# Patient Record
Sex: MALE | Race: WHITE | URBAN - METROPOLITAN AREA
[De-identification: names, ages, dates, MRNs, and addresses within clinical notes are randomized per-mention and may not be internally consistent; named-entity substitution may affect disease eponyms.]

---

## 2017-03-26 ENCOUNTER — TRANSFERRED RECORDS (OUTPATIENT)
Dept: HEALTH INFORMATION MANAGEMENT | Facility: CLINIC | Age: 67
End: 2017-03-26

## 2017-04-12 ENCOUNTER — TRANSFERRED RECORDS (OUTPATIENT)
Dept: HEALTH INFORMATION MANAGEMENT | Facility: CLINIC | Age: 67
End: 2017-04-12

## 2017-04-26 ENCOUNTER — HOSPITAL ENCOUNTER (OUTPATIENT)
Dept: CARDIAC REHAB | Facility: CLINIC | Age: 67
End: 2017-04-26
Attending: INTERNAL MEDICINE
Payer: COMMERCIAL

## 2017-04-26 VITALS — WEIGHT: 148 LBS | HEIGHT: 67 IN | BODY MASS INDEX: 23.23 KG/M2

## 2017-04-26 PROCEDURE — 40000575 ZZH STATISTIC OP CARDIAC VISIT #2

## 2017-04-26 PROCEDURE — 40000116 ZZH STATISTIC OP CR VISIT

## 2017-04-26 PROCEDURE — 93797 PHYS/QHP OP CAR RHAB WO ECG: CPT

## 2017-04-26 PROCEDURE — 93798 PHYS/QHP OP CAR RHAB W/ECG: CPT

## 2017-04-26 ASSESSMENT — 6 MINUTE WALK TEST (6MWT)
TOTAL DISTANCE WALKED (FT): 1370
MALE CALC: 1769.83
FEMALE CALC: 1646.91
PREDICTED: 1780.62
GENDER SELECTION: MALE

## 2017-04-26 NOTE — PROGRESS NOTES
04/26/17 0800   Session  Les Morse  STEMI, PCI   Session Initial Evaluation and Exercise Prescription   Certified through this date 05/25/17   Cardiac Rehab Assessment    I have established, reviewed and made necessary changes to the individualized treatment plan and exercise prescription for this patient.    Physician Name (printed): ________________________   Date: _______  Time: ______    Physician Signature: ___________________________________________       Cardiac Rehab Assessment 4/26/17 Pt. presents to initial evaluation following at STEMI, PCI on 3/26/17. Pt. developed shingles on 3/22/17 and was told that if it began to spread that he should return to the hospital. Pt. woke up on 3/26/17 and noticed that the shingles had began to spread, he laid down on the couch and then began experiencing a sharp chest pain that radiated down his left arm. Pt. went to ER, where a 100% blockage to the LAD was found. The day after the stent placement, pt. noticed a cough and had trouble breathing. He began to develop acute hypoxic respiratory failure and required intubation on 3/30/17. Pt. had bleeding in his ETT. Bleeding did resolve in a few days and was extubated. Pt. did well after extubation and was able to discharge to home. Pt. is still dealing with the pain from shingles, but reports it is getting better. Pt. was very active before the STEMI and is looking forward to attending cardiac rehab to regain strength and endurance.     The patient's history and clinical status including hemodynamics and ECG were evaluated.  The patient was assessed to be stable and appropriate to begin exercise.   The patient's functional capacity and exercise prescription were determined by the completion of the 6 minute walk test.  See results below.  The patient was oriented to the program.  Risk factor profile was completed. Goals and objectives were discussed with patient participation and approval. CV response was WNL. No  symptoms, complaints or pain were reported. Good prognosis for reaching above goals. Skilled therapy is necessary in order to monitor CV response to exercise, to provide education on risk factors and behavior change counseling needed to achieve patient's goals.  Plan to progress to 30-40 minutes of exercise prior to discharge from cardiac rehab.  Initial THR of 20-30 beats above RHR; Effort rating of 4-6.  Initiate muscle conditioning as appropriate.  Provide risk factor education and behavior change counseling.      General Information   Treatment Diagnosis STEMI   Date of Treatment Diagnosis 03/26/17   Secondary Treatment Diagnosis Stent   Significant Past CV History None   Comorbidities Metastatic Cancer   Other Medical History Metastatic Colon Cancer to the Liver, recent shingles   Lead up symptoms Chest pain   Hospital Location [facility] Discharge Date 04/05/17   Signs and Symptoms Post Hospital Discharge Fatigue   Outpatient Cardiac Rehab Start Date 04/26/17   Primary Physician Dr. Bren Sheehan   Primary Physician Follow Up Scheduled   Cardiologist Dr. Greene   Cardiologist Follow Up Scheduled   Ejection Fraction 40%   Risk Stratification Moderate   Summary of Cath Report   Summary of Cath Report Available   Date Performed 03/26/17   LAD mid: 100%   Living and Work Status    Living Arrangements and Social Status house   Support System Live with an adult   Return to Employment Yes   Occupation , writes books, has podcasts   Preventative Medications   CMS recommended medications Ace inhibitors;Beta Blocker;Antiplatelets;Lipid Lowering   Falls Screen   Have you fallen two or more times in the past year? No   Have you fallen and had an injury in the past year? No   Referral Initiated to Physical Therapy No   Pain   Patient Currently in Pain Yes   Pain Location Left ear - from shingles   Pain Rating 3-4/10   Pain Description Sharp;Burning   Physical Assessments   Incisions WNL   Edema  "None   Right Lung Sounds normal   Left Lung Sounds normal   Limitations No limitations   Individualized Treatment Plan   Monitored Sessions Scheduled 18   Monitored Sessions Attended 1   Oxygen   Supplemental Oxygen needed No   Nutrition Management - Weight Management   Assessment Initial Assessment   Age 66   Weight 67.1 kg (148 lb)   Height 1.702 m (5' 7\")   BMI (Calculated) 23.23   Initial Rate Your Plate Score. Dietary tool to assess eating patterns. Scores range from 24 to 72. The higher the score the healthier the eating pattern. 57   Nutrition Management - Lipids   Lipids Labs Not Available   Prescribed Lipid Medication Yes   Statin Intensity High Intensity   Nutrition Management - Diabetes   Diabetes No   Nutrition Management Summary   Dietary Recommendations Low Sodium;Low Fat;Low Cholesterol   Stages of Change for Diet Compliance Maintenance   Interventions Planned Attend Nutrition Education Class(es)   Psychosocial Management   Psychosocial Assessment Initial   Is there history of clinical depression or increased risk of depression? No previous history   Current Level of Stress per Patient Report Moderate    Current Coping Skills Uses Stress Management/Relaxation Techniques;Has Positive Support System   Initial Patient Health Questionnaire -9 Score (PHQ-9) for depression. 5-9 Minimal symptoms, 10-14 Minor depression, 15-19 Major depression, moderately severe, > 20 Major depression, severe  2   Initial [facility] Survey score.  Quality of Life:   If total score > 25 review individual areas where patient rated a 4 or 5.  Consider patients current medical condition and what role that plays on the score.   Adjust treatment protocol to improve areas of concern.  Consider the following:  PHQ9 score, DASI, and re-assessment within the next 30 days to assist with developing treatments.  25   Stages of Change Preparation   Interventions Planned Patient to verbalize understanding of behavioral assessment " results;Patient to verbalize understanding of negative impact of stress to personal health;Patient will recognize signs and symptoms of depression;Patient to attend stress management class(es)   Patient Goal No   Psychosocial Comments 4/26/17 Pt. was previously , which was very stressful. Pt. has been cutting down on responsibilities since being in the hospital.   Other Core Components - Hypertension   History of or Diagnosis of Hypertension Yes   Currently taking Anti-Hypertensives Yes;Beta blocker;Ace Inhibitor   Other Core Components - Tobacco   History of Tobacco Use Yes   Quit Date or Planned Quit Date 01/01/80   Tobacco Use Status Former (Quit > 6 mo ago)   Tobacco Habit Cigarettes   Stages of Change Maintenance   Other Core Components Summary   Interventions Planned Instruct patient on the DASH diet   Patient Goals No   Activity/Exercise History   Activity/Exercise Assessment Initial   Activity/Exercise Status prior to event? Was Physically Active;Participated in an Exercise Program   Number of Days Currently participating in Moderate Physical Activity? 0   Number of Days Currently performing  Aerobic Exercise (including rehab)? 7   Number of Minutes per Session Currently of Aerobic Exercise (average)? 27   Current Stage of Change (Physical Activity) Preparation   Current Stage of Change (Aerobic Exercise) Action   Patient Goals Goal #1   Goal #1 Description Pt. will regain strength and endurance by attending [facility] 2 days per week and participating in aerobic exercise 2-3 days outside of rehab.   Goal #1 Target Date 06/26/17   Activity/Exercise Comments 4/26/17 Pt. states that he currently is walking about 1.5 miles everyday.   Activity/Exercise Target Outcome An Accumulation of 150  Minutes of Aerobic Activity per Week   Exercise Assessment   6 Minute Walk Predicted - Gender Selection Male   6 Minute Walk Predicted (Male) 1769.83   6 Minute Walk Predicted (Female) 1646.91   Initial 6 Minute  Walk Distance (Feet) 1370 ft   Resting HR 69 bpm   Exercise HR 81 bpm   Post Exercise HR 69 bpm   Resting /68   Exercise /68   Post Exercise /68   Effort Rating 4   Current MET Level 3   MET Level Goal 4.5-5   ECG Rhythm Normal sinus rhythm   Ectopy None   Current Symptoms Denies symptoms   Limitations/Restrictions None   Exercise Prescription   Mode Treadmill;Weights   Duration/Time 15-30 min;Intermittent bouts   Frequency 2 days/week   THR (85% of age predicted max HR) 132.6   OMNI Effort Rating (0-10 Scale) 4-6/10   Progression Continuous bouts;Intermittent bouts;Progress peak intensity by 1/4 MET per week;Progress peak intensity by 1/2 MET per week;Aerobic exercise to OMNI rating of 6 or below and at or below THR;Total exercise time of 20-30 minutes   Recommended Home Exercise   Type of Exercise Walking   Frequency (days per week) 2-3   Duration (minutes per session) 15-30 min;Intermittent   Effort Rating Recommended 4-6/10   30 Day Exercise Plan Pt. was encouraged    Learning Assessment   Learner Patient   Primary Language English   Preferred Learning Style Listening;Reading;Demonstration;Pictures/Video   Barriers to Learning No barriers noted   Patient Education   Education recommended Anatomy and Physiology of the Heart;Blood Pressure;Exercise Principles;Medication Overview;Nutrition;Stress Management   Education Comments 4/26/17 Pt. states him and his wife are very interested in coming to education classes.

## 2017-05-02 ENCOUNTER — HOSPITAL ENCOUNTER (OUTPATIENT)
Dept: CARDIAC REHAB | Facility: CLINIC | Age: 67
End: 2017-05-02
Attending: INTERNAL MEDICINE
Payer: COMMERCIAL

## 2017-05-02 PROCEDURE — 40000116 ZZH STATISTIC OP CR VISIT: Performed by: REHABILITATION PRACTITIONER

## 2017-05-02 PROCEDURE — 93798 PHYS/QHP OP CAR RHAB W/ECG: CPT | Performed by: REHABILITATION PRACTITIONER

## 2017-05-08 ENCOUNTER — HOSPITAL ENCOUNTER (OUTPATIENT)
Dept: CARDIAC REHAB | Facility: CLINIC | Age: 67
End: 2017-05-08
Attending: INTERNAL MEDICINE
Payer: COMMERCIAL

## 2017-05-08 PROCEDURE — 40000116 ZZH STATISTIC OP CR VISIT: Performed by: OCCUPATIONAL THERAPIST

## 2017-05-08 PROCEDURE — 93798 PHYS/QHP OP CAR RHAB W/ECG: CPT | Performed by: OCCUPATIONAL THERAPIST

## 2017-05-11 ENCOUNTER — HOSPITAL ENCOUNTER (OUTPATIENT)
Dept: CARDIAC REHAB | Facility: CLINIC | Age: 67
End: 2017-05-11
Attending: INTERNAL MEDICINE
Payer: COMMERCIAL

## 2017-05-11 VITALS — BODY MASS INDEX: 23.67 KG/M2 | WEIGHT: 150.8 LBS | HEIGHT: 67 IN

## 2017-05-11 PROCEDURE — 93798 PHYS/QHP OP CAR RHAB W/ECG: CPT | Performed by: OCCUPATIONAL THERAPIST

## 2017-05-11 PROCEDURE — 40000116 ZZH STATISTIC OP CR VISIT: Performed by: OCCUPATIONAL THERAPIST

## 2017-05-11 ASSESSMENT — 6 MINUTE WALK TEST (6MWT)
TOTAL DISTANCE WALKED (FT): 1370
FEMALE CALC: 1637.49
MALE CALC: 1762.99
GENDER SELECTION: MALE
PREDICTED: 1773.74

## 2017-05-11 NOTE — PROGRESS NOTES
Les Morse 66 year old   STEMI, Stent  05/11/17 1400   Session   Session 30 Day Individualized Treatment Plan   Certified through this date 06/10/17     Physician cosignature/electronic signature indicates approval of this ITP document. I have established, reviewed and made necessary changes to the individualized treatment plan and exercise prescription for this patient.   Cardiac Rehab Assessment   Cardiac Rehab Assessment 4/26/17 Pt. presents to initial evaluation following at STEMI, PCI on 3/26/17. Pt. developed shingles on 3/22/17 and was told that if it began to spread that he should return to the hospital. Pt. woke up on 3/26/17 and noticed that the shingles had began to spread, he laid down on the couch and then began experiencing a sharp chest pain that radiated down his left arm. Pt. went to ER, where a 100% blockage to the LAD was found. The day after the stent placement, pt. noticed a cough and had trouble breathing. He began to develop acute hypoxic repiratory failure and required intubation on 3/30/17. Pt. had bleeding in his ETT. Bleeding did resolve in a few days and was extubated. Pt. did well after extubation and was able to discharge to home. Pt. is still dealing with the pain from shingles, but reports it is getting better. Pt. was very active before the STEMI and is looking forward to attending cardiac rehab to regain strength and endurance. 5/11/2017 ITP forwarded for medical director's review. No change in current ITP plan. Plan to do progress update next week.   General Information   Treatment Diagnosis STEMI   Date of Treatment Diagnosis 03/26/17   Secondary Treatment Diagnosis Stent   Significant Past CV History None   Comorbidities Metastatic Cancer   Other Medical History Metastatic Colon Cancer to the Liver, recent shingles   Lead up symptoms Chest pain   Hospital Location [facility] Discharge Date 04/05/17   Signs and Symptoms Post Hospital Discharge Fatigue  "  Outpatient Cardiac Rehab Start Date 04/26/17   Primary Physician Dr. Bren Sheehan   Primary Physician Follow Up Scheduled   Cardiologist Dr. Greene   Cardiologist Follow Up Scheduled   Ejection Fraction 40%   Risk Stratification Moderate   Summary of Cath Report   Summary of Cath Report Available   Date Performed 03/26/17   LAD mid: 100%   Living and Work Status    Living Arrangements and Social Status house   Support System Live with an adult   Return to Employment Yes   Occupation , writes books, has podcasts   Preventative Medications   CMS recommended medications Ace inhibitors;Beta Blocker;Antiplatelets;Lipid Lowering   Falls Screen   Have you fallen two or more times in the past year? No   Have you fallen and had an injury in the past year? No   Referral Initiated to Physical Therapy No   Pain   Patient Currently in Pain Yes   Pain Location Left ear   Pain Rating 3-4/10   Pain Description Sharp;Burning   Physical Assessments   Incisions WNL   Edema None   Right Lung Sounds normal   Left Lung Sounds normal   Limitations No limitations   Individualized Treatment Plan   Monitored Sessions Scheduled 18   Monitored Sessions Attended 1   Oxygen   Supplemental Oxygen needed No   Nutrition Management - Weight Management   Assessment Re-assessment   Age 66   Weight 68.4 kg (150 lb 12.8 oz)   Height 1.702 m (5' 7.01\")   BMI (Calculated) 23.66   Initial Rate Your Plate Score. Dietary tool to assess eating patterns. Scores range from 24 to 72. The higher the score the healthier the eating pattern. 57   Nutrition Management - Lipids   Lipids Labs Not Available   Prescribed Lipid Medication Yes   Statin Intensity High Intensity   Nutrition Management - Diabetes   Diabetes No   Nutrition Management Summary   Dietary Recommendations Low Sodium;Low Fat;Low Cholesterol   Stages of Change for Diet Compliance Maintenance   Interventions Planned Attend Nutrition Education Class(es)   Psychosocial Management "   Psychosocial Assessment Initial   Is there history of clinical depression or increased risk of depression? No previous history   Current Level of Stress per Patient Report Moderate    Current Coping Skills Uses Stress Management/Relaxation Techniques;Has Positive Support System   Initial Patient Health Questionnaire -9 Score (PHQ-9) for depression. 5-9 Minimal symptoms, 10-14 Minor depression, 15-19 Major depression, moderately severe, > 20 Major depression, severe  2   Initial [facility] Survey score.  Quality of Life:   If total score > 25 review individual areas where patient rated a 4 or 5.  Consider patients current medical condition and what role that plays on the score.   Adjust treatment protocol to improve areas of concern.  Consider the following:  PHQ9 score, DASI, and re-assessment within the next 30 days to assist with developing treatments.  25   Stages of Change Preparation   Interventions Planned Patient to verbalize understanding of behavioral assessment results;Patient to verbalize understanding of negative impact of stress to personal health;Patient will recognize signs and symptoms of depression;Patient to attend stress management class(es)   Patient Goal No   Psychosocial Comments 4/26/17 Pt. was previously , which was very stressful. Pt. has been cutting down on responsibilities since being in the hospital.   Other Core Components - Hypertension   History of or Diagnosis of Hypertension Yes   Currently taking Anti-Hypertensives Yes;Beta blocker;Ace Inhibitor   Other Core Components - Tobacco   History of Tobacco Use Yes   Quit Date or Planned Quit Date 01/01/80   Tobacco Use Status Former (Quit > 6 mo ago)   Tobacco Habit Cigarettes   Stages of Change Maintenance   Other Core Components Summary   Interventions Planned Instruct patient on the DASH diet   Patient Goals No   Activity/Exercise History   Activity/Exercise Assessment Initial   Activity/Exercise Status prior to  event? Was Physically Active;Participated in an Exercise Program   Number of Days Currently participating in Moderate Physical Activity? 0   Number of Days Currently performing  Aerobic Exercise (including rehab)? 7   Number of Minutes per Session Currently of Aerobic Exercise (average)? 27   Current Stage of Change (Physical Activity) Preparation   Current Stage of Change (Aerobic Exercise) Action   Patient Goals Goal #1   Goal #1 Description Pt. will regain strength and endurance by attending [facility] 2 days per week and participating in aerobic exercise 2-3 days outside of rehab.   Goal #1 Target Date 06/26/17   Activity/Exercise Comments 4/26/17 Pt. states that he currently is walking about 1.5 miles everyday.   Activity/Exercise Target Outcome An Accumulation of 150  Minutes of Aerobic Activity per Week   Exercise Assessment   6 Minute Walk Predicted - Gender Selection Male   6 Minute Walk Predicted (Male) 1762.99   6 Minute Walk Predicted (Female) 1637.49   Initial 6 Minute Walk Distance (Feet) 1370 ft   Resting HR 67 bpm   Exercise HR 96 bpm   Post Exercise HR 70 bpm   Resting /72   Exercise /60   Post Exercise /72   Effort Rating 7   Current MET Level 3.4   MET Level Goal 4.5-5   ECG Rhythm Normal sinus rhythm   Ectopy None   Current Symptoms Denies symptoms   Limitations/Restrictions None   Exercise Prescription   Mode Treadmill;Weights   Duration/Time 15-30 min;Intermittent bouts   Frequency 2 days/week   THR (85% of age predicted max HR) 132.6   OMNI Effort Rating (0-10 Scale) 4-6/10   Progression Continuous bouts;Intermittent bouts;Progress peak intensity by 1/4 MET per week;Progress peak intensity by 1/2 MET per week;Aerobic exercise to OMNI rating of 6 or below and at or below THR;Total exercise time of 20-30 minutes   Recommended Home Exercise   Type of Exercise Walking   Frequency (days per week) 2-3   Duration (minutes per session) 15-30 min;Intermittent   Effort Rating Recommended  4-6/10   30 Day Exercise Plan Pt. was encouraged    Learning Assessment   Learner Patient   Primary Language English   Preferred Learning Style Listening;Reading;Demonstration;Pictures/Video   Barriers to Learning No barriers noted   Patient Education   Education recommended Anatomy and Physiology of the Heart;Blood Pressure;Exercise Principles;Medication Overview;Nutrition;Stress Management   Education Comments 4/26/17 Pt. state him and his wife are very interested in coming to education classes.

## 2017-05-15 ENCOUNTER — HOSPITAL ENCOUNTER (OUTPATIENT)
Dept: CARDIAC REHAB | Facility: CLINIC | Age: 67
End: 2017-05-15
Attending: INTERNAL MEDICINE
Payer: COMMERCIAL

## 2017-05-15 VITALS — BODY MASS INDEX: 23.92 KG/M2 | HEIGHT: 67 IN | WEIGHT: 152.4 LBS

## 2017-05-15 PROCEDURE — 40000116 ZZH STATISTIC OP CR VISIT: Performed by: REHABILITATION PRACTITIONER

## 2017-05-15 PROCEDURE — 93798 PHYS/QHP OP CAR RHAB W/ECG: CPT | Performed by: REHABILITATION PRACTITIONER

## 2017-05-15 ASSESSMENT — 6 MINUTE WALK TEST (6MWT)
PREDICTED: 1769.51
GENDER SELECTION: MALE
TOTAL DISTANCE WALKED (FT): 1370
FEMALE CALC: 1632.02
MALE CALC: 1758.79

## 2017-05-15 NOTE — PROGRESS NOTES
Les Morse 66 year old   STEMI,Stent 05/15/17 1400   Session   Session 60 Day Individualized Treatment Plan   Certified through this date 07/08/17   Physician cosignature/electronic signature indicates approval of this ITP document. I have established, reviewed and made necessary changes to the individualized treatment plan and exercise prescription for this patient.   Cardiac Rehab Assessment   Cardiac Rehab Assessment 4/26/17 Pt. presents to initial evaluation following at STEMI, PCI on 3/26/17. Pt. developed shingles on 3/22/17 and was told that if it began to spread that he should return to the hospital. Pt. woke up on 3/26/17 and noticed that the shingles had began to spread, he laid down on the couch and then began experiencing a sharp chest pain that radiated down his left arm. Pt. went to ER, where a 100% blockage to the LAD was found. The day after the stent placement, pt. noticed a cough and had trouble breathing. He began to develop acute hypoxic repiratory failure and required intubation on 3/30/17. Pt. had bleeding in his ETT. Bleeding did resolve in a few days and was extubated. Pt. did well after extubation and was able to discharge to home. Pt. is still dealing with the pain from shingles, but reports it is getting better. Pt. was very active before the STEMI and is looking forward to attending cardiac rehab to regain strength and endurance. 5/11/2017 ITP forwarded for medical director's review. No change in current ITP plan. Plan to do progress update next week. 5/15/2017 Progress update done today. PT is steadily progressing with the exercise levels. He is walking daily at home and has returned to work part-time-15 hours per week. PT has a strong Taoism eva and believes strongly that this help him through this last crisis. He doesn't feel he has many risk factors so he not sure why he had a MI. He will be having an echocardiogram later this week. Reviewed DASH diet with PT as he did not  "realize he had high blood pressure. He continues to benefit from education on recovery post MI as well as counseling for mangement of stress levels with skilled monitoring for sx as progress to higher levels.    General Information   Treatment Diagnosis STEMI   Date of Treatment Diagnosis 03/26/17   Secondary Treatment Diagnosis Stent   Significant Past CV History None   Comorbidities Metastatic Cancer   Other Medical History Metastatic Colon Cancer to the Liver, recent shingles   Lead up symptoms Chest pain   Hospital Location [facility]   Hospital Discharge Date 04/05/17   Signs and Symptoms Post Hospital Discharge Fatigue   Outpatient Cardiac Rehab Start Date 04/26/17   Primary Physician Dr. Bren Sheehan   Primary Physician Follow Up Scheduled   Cardiologist Dr. Greene   Cardiologist Follow Up Scheduled   Ejection Fraction 40%   Risk Stratification Moderate   Summary of Cath Report   Summary of Cath Report Available   Date Performed 03/26/17   LAD mid: 100%   Living and Work Status    Living Arrangements and Social Status house   Support System Live with an adult   Return to Employment Yes   Occupation , writes books, has podcasts   Preventative Medications   CMS recommended medications Ace inhibitors;Beta Blocker;Antiplatelets;Lipid Lowering   Falls Screen   Have you fallen two or more times in the past year? No   Have you fallen and had an injury in the past year? No   Referral Initiated to Physical Therapy No   Pain   Patient Currently in Pain No   Physical Assessments   Incisions WNL   Edema None   Right Lung Sounds normal   Left Lung Sounds normal   Limitations No limitations   Individualized Treatment Plan   Monitored Sessions Scheduled 18   Monitored Sessions Attended 5   Oxygen   Supplemental Oxygen needed No   Nutrition Management - Weight Management   Assessment Re-assessment   Age 66   Weight 69.1 kg (152 lb 6.4 oz)   Height 1.702 m (5' 7.01\")   BMI (Calculated) 23.91   Initial Rate " Your Plate Score. Dietary tool to assess eating patterns. Scores range from 24 to 72. The higher the score the healthier the eating pattern. 57   Weight Management Comments 5/15/2017 Weight is appropriate.   Nutrition Management - Lipids   Lipids Labs Not Available   Prescribed Lipid Medication Yes   Statin Intensity High Intensity   Lipid Comments 5/15/2017 No lab values available.   Nutrition Management - Diabetes   Diabetes No   Nutrition Management Summary   Dietary Recommendations Low Sodium;Low Fat;Low Cholesterol   Stages of Change for Diet Compliance Maintenance   Interventions Planned Attend Nutrition Education Class(es)   Interventions In Progress or Completed Other (see comments)   Nutrition Summary Comments 5/15/2017 PT feels he has a good understanding of his diet. He is not planning to attend the nutrition classes.   Psychosocial Management   Psychosocial Assessment Re-assessment   Is there history of clinical depression or increased risk of depression? No previous history   Current Level of Stress per Patient Report Moderate    Current Coping Skills Uses Stress Management/Relaxation Techniques;Has Positive Support System   Initial Patient Health Questionnaire -9 Score (PHQ-9) for depression. 5-9 Minimal symptoms, 10-14 Minor depression, 15-19 Major depression, moderately severe, > 20 Major depression, severe  2   Initial [facility] Survey score.  Quality of Life:   If total score > 25 review individual areas where patient rated a 4 or 5.  Consider patients current medical condition and what role that plays on the score.   Adjust treatment protocol to improve areas of concern.  Consider the following:  PHQ9 score, DASI, and re-assessment within the next 30 days to assist with developing treatments.  25   Stages of Change Preparation   Interventions Planned Patient to verbalize understanding of behavioral assessment results;Patient to verbalize understanding of negative impact of stress to personal  health;Patient will recognize signs and symptoms of depression;Patient to attend stress management class(es)   Patient Goal No   Psychosocial Comments 4/26/17 Pt. was previously , which was very stressful. Pt. has been cutting down on responsibilities since being in the hospital. 5/15/2017 PT has returned part-time to work. He denies any stress with work currently. His blood pressure had been higher in the past with his health issues as they occurred. He is thinking of attending the class on stress management. Given updated class schedule. He has a strong eva and believes in a higher power with illness.   Other Core Components - Hypertension   History of or Diagnosis of Hypertension Yes   Currently taking Anti-Hypertensives Yes;Beta blocker;Ace Inhibitor   Hypertension Comments 5/15/2017 PT did not realize he had high blood pressure and was on medication to keep it under control. His BP has been well-controlled while in rehab. Explained to PT the purpose on the Metoprolol and lisinopril in controlling blood pressure.    Other Core Components - Tobacco   History of Tobacco Use Yes   Quit Date or Planned Quit Date 01/01/80   Tobacco Use Status Former (Quit > 6 mo ago)   Tobacco Habit Cigarettes   Stages of Change Maintenance   Other Core Components Summary   Interventions Planned Instruct patient on the DASH diet   Interventions In Progress or Completed Instructed on DASH diet   Patient Goals No   Other Core Components Comments 5/15/2017 Given written information on the DASH diet and explained how it can help to control blood pressure.   Activity/Exercise History   Activity/Exercise Assessment Re-assessment   Activity/Exercise Status prior to event? Was Physically Active;Participated in an Exercise Program   Number of Days Currently participating in Moderate Physical Activity? 0   Number of Days Currently performing  Aerobic Exercise (including rehab)? 7   Number of Minutes per Session Currently of  Aerobic Exercise (average)? 30-40   Current Stage of Change (Physical Activity) Preparation   Current Stage of Change (Aerobic Exercise) Action   Patient Goals Goal #1   Goal #1 Description Pt. will regain strength and endurance by attending [facility] 2 days per week and participating in aerobic exercise 2-3 days outside of rehab.   Goal #1 Target Date 06/26/17   Goal #1 Progress Towards Goal 5/15/2017 PT is attending cardiac rehab and walking daily for 1.5-2 miles. He found walking at the faster speed of 2.8 mph to be very challenging last session, but would like to try again next time. He is currently at 2.7 mph with a 1% grade. Endurance is still limited at higher levels of exertion. Explained how his EF may be affecting his endurance at this point.   Activity/Exercise Comments 4/26/17 Pt. states that he currently is walking about 1.5 miles everyday.   Activity/Exercise Target Outcome An Accumulation of 150  Minutes of Aerobic Activity per Week   Exercise Assessment   6 Minute Walk Predicted - Gender Selection Male   6 Minute Walk Predicted (Male) 1758.79   6 Minute Walk Predicted (Female) 1632.02   Initial 6 Minute Walk Distance (Feet) 1370 ft   Resting HR 67 bpm   Exercise  bpm   Post Exercise HR 79 bpm   Resting /76   Exercise /70   Post Exercise BP 90/60   Effort Rating 4   Current MET Level 3.4   MET Level Goal 4.5-5   ECG Rhythm Normal sinus rhythm   Ectopy None   Current Symptoms Denies symptoms   Limitations/Restrictions None   Exercise Prescription   Mode Treadmill;Weights   Duration/Time 30-45 min   Frequency 2 days/week   THR (85% of age predicted max HR) 132.6   OMNI Effort Rating (0-10 Scale) 4-6/10   Progression Continuous bouts;Aerobic exercise to OMNI rating of 6 or below and at or below THR;Total exercise time of 30-45 minutes;Progress peak intensity by 1/4 MET per week   Comments 5/15/2017 PT prefers to only exercise on the TM.   Recommended Home Exercise   Type of Exercise Walking    Frequency (days per week) 2-3   Duration (minutes per session) 30-45 min   Effort Rating Recommended 4-6/10   30 Day Exercise Plan Pt. was encouraged. 5/15/2017 To continue with the walking of 1.5-2 miles. He may eventually go to the fitness club to exercise.   Current Home Exercise   Type of Exercise Walking   Frequency (days per week) 5   Duration (minutes per session) 30-40   Follow-up/On-going Support   Provider follow-up needed on the following No follow-up needed   Learning Assessment   Learner Patient   Primary Language English   Preferred Learning Style Listening;Reading;Demonstration;Pictures/Video   Barriers to Learning No barriers noted   Patient Education   Education recommended Anatomy and Physiology of the Heart;Blood Pressure;Exercise Principles;Medication Overview;Nutrition;Stress Management   Education Comments 4/26/17 Pt. state him and his wife are very interested in coming to education classes.

## 2017-05-18 ENCOUNTER — HOSPITAL ENCOUNTER (OUTPATIENT)
Dept: CARDIAC REHAB | Facility: CLINIC | Age: 67
End: 2017-05-18
Attending: INTERNAL MEDICINE
Payer: COMMERCIAL

## 2017-05-18 PROCEDURE — 93798 PHYS/QHP OP CAR RHAB W/ECG: CPT

## 2017-05-18 PROCEDURE — 40000116 ZZH STATISTIC OP CR VISIT

## 2017-05-23 ENCOUNTER — HOSPITAL ENCOUNTER (OUTPATIENT)
Dept: CARDIAC REHAB | Facility: CLINIC | Age: 67
End: 2017-05-23
Attending: INTERNAL MEDICINE
Payer: COMMERCIAL

## 2017-05-23 PROCEDURE — 93798 PHYS/QHP OP CAR RHAB W/ECG: CPT | Performed by: REHABILITATION PRACTITIONER

## 2017-05-23 PROCEDURE — 40000116 ZZH STATISTIC OP CR VISIT: Performed by: REHABILITATION PRACTITIONER

## 2017-05-26 ENCOUNTER — HOSPITAL ENCOUNTER (OUTPATIENT)
Dept: CARDIAC REHAB | Facility: CLINIC | Age: 67
End: 2017-05-26
Attending: INTERNAL MEDICINE
Payer: COMMERCIAL

## 2017-05-26 VITALS — HEIGHT: 67 IN | BODY MASS INDEX: 23.7 KG/M2 | WEIGHT: 151 LBS

## 2017-05-26 PROCEDURE — 93798 PHYS/QHP OP CAR RHAB W/ECG: CPT | Performed by: REHABILITATION PRACTITIONER

## 2017-05-26 PROCEDURE — 40000575 ZZH STATISTIC OP CARDIAC VISIT #2

## 2017-05-26 PROCEDURE — 93797 PHYS/QHP OP CAR RHAB WO ECG: CPT

## 2017-05-26 PROCEDURE — 40000116 ZZH STATISTIC OP CR VISIT: Performed by: REHABILITATION PRACTITIONER

## 2017-05-26 ASSESSMENT — 6 MINUTE WALK TEST (6MWT)
PREDICTED: 1773.21
TOTAL DISTANCE WALKED (FT): 1370
FEMALE CALC: 1636.8
GENDER SELECTION: MALE
MALE CALC: 1762.46

## 2017-05-26 NOTE — PROGRESS NOTES
05/26/17 0900   Session  Les Morse  66 year old  STEMI s/p stent   Session Progress Update   Certified through this date 07/08/17   Cardiac Rehab Assessment     Cardiac Rehab Assessment Pt. is still dealing with the pain from shingles, but reports it is getting better. Pt. was very active before the STEMI and is looking forward to attending cardiac rehab to regain strength and endurance. 5/11/2017 ITP forwarded for medical director's review. No change in current ITP plan. Plan to do progress update next week. 5/15/2017 Progress update done today. PT is steadily progressing with the exercise levels. He is walking daily at home and has returned to work part-time-15 hours per week. PT has a strong Orthodox eva and believes strongly that this help him through this last crisis. He doesn't feel he has many risk factors so he not sure why he had a MI. He will be having an echocardiogram later this week. Reviewed DASH diet with PT as he did not realize he had high blood pressure. He continues to benefit from education on recovery post MI as well as counseling for mangement of stress levels with skilled monitoring for sx as progress to higher levels. 5/26/2017. 1:1 consult completed. Reviewed risk factor profile report. PT is pleased with progress. PT is tolerating 3.9 METS without symptoms or complaints. PT is very motivated to making lifestyle changes. PT is very aware of low fat/salt choices. PT is reading labels and cardiac diet recommendations. After discussing risk factors, PT was able to identify that stress could be a primary contributor as he had gone through a lot of changes and health issues this past year. PT plans to go to the Healing Process class and also may be interested in meeting with the  at some point. PT expressed how fortunate he feels to be alive, but it has been hard to navigate throught his feelings, especially when it can be hard for others to understand. PT does not feel he is  depressed, but is interested in pursuing ways to discuss his feelings. Wife has been good support. PT is walking on days not coming to rehab for at least 30 minutes. PT will be meeting with cardiologist at the end of June. PT is looking forward to meeting with MD to discuss future medication usage. PT has recently had beta blocker decreased due to symptoms of lightheadedness. PT reports that he feels 90% back to normal. PT continues to benefit from rehab for continued risk factor modification in regards to exercise and education post MI and stress management to decrease cardiac risk.    General Information   Treatment Diagnosis STEMI   Date of Treatment Diagnosis 03/26/17   Secondary Treatment Diagnosis Stent   Significant Past CV History None   Comorbidities Metastatic Cancer   Other Medical History Metastatic Colon Cancer to the Liver, recent shingles   Lead up symptoms Chest pain   Hospital Location [facility]   Hospital Discharge Date 04/05/17   Signs and Symptoms Post Hospital Discharge Fatigue   Outpatient Cardiac Rehab Start Date 04/26/17   Primary Physician Dr. Bren Sheehan   Primary Physician Follow Up Scheduled   Cardiologist Dr. Greene   Cardiologist Follow Up Scheduled   Ejection Fraction 40%   Risk Stratification Moderate   Summary of Cath Report   Summary of Cath Report Available   Date Performed 03/26/17   LAD mid: 100%   Living and Work Status    Living Arrangements and Social Status house   Support System Live with an adult   Return to Employment Yes   Occupation , writes books, has podcasts   Preventative Medications   CMS recommended medications Ace inhibitors;Beta Blocker;Antiplatelets;Lipid Lowering   Falls Screen   Have you fallen two or more times in the past year? No   Have you fallen and had an injury in the past year? No   Referral Initiated to Physical Therapy No   Pain   Patient Currently in Pain No   Physical Assessments   Incisions WNL   Edema None   Right Lung Sounds  "normal   Left Lung Sounds normal   Limitations No limitations   Individualized Treatment Plan   Monitored Sessions Scheduled 18   Monitored Sessions Attended 8   Oxygen   Supplemental Oxygen needed No   Nutrition Management - Weight Management   Assessment Re-assessment   Age 66   Weight 68.5 kg (151 lb)   Height 1.702 m (5' 7.01\")   BMI (Calculated) 23.69   Initial Rate Your Plate Score. Dietary tool to assess eating patterns. Scores range from 24 to 72. The higher the score the healthier the eating pattern. 57   Weight Management Comments 5/15/2017 Weight is appropriate. 5/26/2017. No change.    Nutrition Management - Lipids   Lipids Labs Not Available   Prescribed Lipid Medication Yes   Statin Intensity High Intensity   Lipid Comments 5/15/2017 No lab values available.   Nutrition Management - Diabetes   Diabetes No   Nutrition Management Summary   Dietary Recommendations Low Sodium;Low Fat;Low Cholesterol   Stages of Change for Diet Compliance Maintenance   Interventions Planned Attend Nutrition Education Class(es)   Interventions In Progress or Completed Other (see comments)   Nutrition Summary Comments 5/15/2017 PT feels he has a good understanding of his diet. He is not planning to attend the nutrition classes. 5/26/2017. No change.    Nutrition Target Outcome BMI < 25   Psychosocial Management   Psychosocial Assessment Re-assessment   Is there history of clinical depression or increased risk of depression? No previous history   Current Level of Stress per Patient Report Moderate    Current Coping Skills Uses Stress Management/Relaxation Techniques;Has Positive Support System   Initial Patient Health Questionnaire -9 Score (PHQ-9) for depression. 5-9 Minimal symptoms, 10-14 Minor depression, 15-19 Major depression, moderately severe, > 20 Major depression, severe  2   Initial [facility] Survey score.  Quality of Life:   If total score > 25 review individual areas where patient rated a 4 or 5.  Consider " patients current medical condition and what role that plays on the score.   Adjust treatment protocol to improve areas of concern.  Consider the following:  PHQ9 score, DASI, and re-assessment within the next 30 days to assist with developing treatments.  25   Stages of Change Preparation   Interventions Planned Patient to verbalize understanding of behavioral assessment results;Patient to verbalize understanding of negative impact of stress to personal health;Patient will recognize signs and symptoms of depression;Patient to attend stress management class(es)   Patient Goal No   Psychosocial Comments 4/26/17 Pt. was previously , which was very stressful. Pt. has been cutting down on responsibilities since being in the hospital. 5/15/2017 PT has returned part-time to work. He denies any stress with work currently. His blood pressure had been higher in the past with his health issues as they occurred. He is thinking of attending the class on stress management. Given updated class schedule. He has a strong eva and believes in a higher power with illness. 5/26/2017. PT plans on going to the Healing Process class. PT reports that he is not depressed, but would like to learn more about about appropriate feelings during recovery. PT may be want to meet with  as well.    Other Core Components - Hypertension   History of or Diagnosis of Hypertension Yes   Currently taking Anti-Hypertensives Yes;Beta blocker;Ace Inhibitor   Hypertension Comments 5/15/2017 PT did not realize he had high blood pressure and was on medication to keep it under control. His BP has been well-controlled while in rehab. Explained to PT the purpose on the Metoprolol and lisinopril in controlling blood pressure. 5/26/2017. BP's well controlled.    Other Core Components - Tobacco   History of Tobacco Use Yes   Quit Date or Planned Quit Date 01/01/80   Tobacco Use Status Former (Quit > 6 mo ago)   Tobacco Habit Cigarettes    Stages of Change Maintenance   Other Core Components Summary   Interventions Planned Instruct patient on the DASH diet   Interventions In Progress or Completed Instructed on DASH diet   Patient Goals No   Other Core Components Comments 5/15/2017 Given written information on the DASH diet and explained how it can help to control blood pressure.   Other Core Components Target Outcome BP < 140/90 or < 130/80 with DM or CKD   Activity/Exercise History   Activity/Exercise Assessment Re-assessment   Activity/Exercise Status prior to event? Was Physically Active;Participated in an Exercise Program   Number of Days Currently participating in Moderate Physical Activity? 0   Number of Days Currently performing  Aerobic Exercise (including rehab)? 7   Number of Minutes per Session Currently of Aerobic Exercise (average)? 30-40   Current Stage of Change (Physical Activity) Preparation   Current Stage of Change (Aerobic Exercise) Action   Patient Goals Goal #1   Goal #1 Description Pt. will regain strength and endurance by attending [facility] 2 days per week and participating in aerobic exercise 2-3 days outside of rehab.   Goal #1 Target Date 06/26/17   Goal #1 Progress Towards Goal 5/15/2017 PT is attending cardiac rehab and walking daily for 1.5-2 miles. He found walking at the faster speed of 2.8 mph to be very challenging last session, but would like to try again next time. He is currently at 2.7 mph with a 1% grade. Endurance is still limited at higher levels of exertion. Explained how his EF may be affecting his endurance at this point. 5/26/2017. No change.    Activity/Exercise Comments 4/26/17 Pt. states that he currently is walking about 1.5 miles everyday.   Activity/Exercise Target Outcome An Accumulation of 150  Minutes of Aerobic Activity per Week   Exercise Assessment   6 Minute Walk Predicted - Gender Selection Male   6 Minute Walk Predicted (Male) 1762.46   6 Minute Walk Predicted (Female) 1636.8   Initial 6 Minute  Walk Distance (Feet) 1370 ft   Resting HR 70 bpm   Exercise  bpm   Post Exercise HR 79 bpm   Resting /60   Exercise /70   Post Exercise /60   Effort Rating 6   Current MET Level 3.9   MET Level Goal 4.5-5   ECG Rhythm Normal sinus rhythm   Ectopy None   Current Symptoms Denies symptoms   Limitations/Restrictions None   Exercise Prescription   Mode Treadmill;Weights   Duration/Time 30-45 min   Frequency 2 days/week   THR (85% of age predicted max HR) 132.6   OMNI Effort Rating (0-10 Scale) 4-6/10   Progression Continuous bouts;Aerobic exercise to OMNI rating of 6 or below and at or below THR;Total exercise time of 30-45 minutes;Progress peak intensity by 1/4 MET per week   Comments 5/15/2017 PT prefers to only exercise on the TM.   Recommended Home Exercise   Type of Exercise Walking   Frequency (days per week) 2-3   Duration (minutes per session) 30-45 min   Effort Rating Recommended 4-6/10   30 Day Exercise Plan Pt. was encouraged. 5/15/2017 To continue with the walking of 1.5-2 miles. He may eventually go to the fitness club to exercise.   Current Home Exercise   Type of Exercise Walking   Frequency (days per week) 5   Duration (minutes per session) 30-40   Follow-up/On-going Support   Provider follow-up needed on the following No follow-up needed   Learning Assessment   Learner Patient   Primary Language English   Preferred Learning Style Listening;Reading;Demonstration;Pictures/Video   Barriers to Learning No barriers noted   Patient Education   Education recommended Anatomy and Physiology of the Heart;Blood Pressure;Exercise Principles;Medication Overview;Nutrition;Stress Management   Education Comments 4/26/17 Pt. state him and his wife are very interested in coming to education classes.

## 2017-05-30 ENCOUNTER — HOSPITAL ENCOUNTER (OUTPATIENT)
Dept: CARDIAC REHAB | Facility: CLINIC | Age: 67
End: 2017-05-30
Attending: INTERNAL MEDICINE
Payer: COMMERCIAL

## 2017-05-30 PROCEDURE — 93798 PHYS/QHP OP CAR RHAB W/ECG: CPT

## 2017-05-30 PROCEDURE — 40000116 ZZH STATISTIC OP CR VISIT

## 2017-06-01 ENCOUNTER — HOSPITAL ENCOUNTER (OUTPATIENT)
Dept: CARDIAC REHAB | Facility: CLINIC | Age: 67
End: 2017-06-01
Attending: INTERNAL MEDICINE
Payer: COMMERCIAL

## 2017-06-01 PROCEDURE — 93798 PHYS/QHP OP CAR RHAB W/ECG: CPT | Performed by: OCCUPATIONAL THERAPIST

## 2017-06-01 PROCEDURE — 40000116 ZZH STATISTIC OP CR VISIT: Performed by: OCCUPATIONAL THERAPIST

## 2017-06-05 ENCOUNTER — HOSPITAL ENCOUNTER (OUTPATIENT)
Dept: CARDIAC REHAB | Facility: CLINIC | Age: 67
End: 2017-06-05
Attending: INTERNAL MEDICINE
Payer: COMMERCIAL

## 2017-06-05 PROCEDURE — 93798 PHYS/QHP OP CAR RHAB W/ECG: CPT

## 2017-06-05 PROCEDURE — 40000116 ZZH STATISTIC OP CR VISIT

## 2017-06-08 ENCOUNTER — HOSPITAL ENCOUNTER (OUTPATIENT)
Dept: CARDIAC REHAB | Facility: CLINIC | Age: 67
End: 2017-06-08
Attending: INTERNAL MEDICINE
Payer: COMMERCIAL

## 2017-06-08 PROCEDURE — 93798 PHYS/QHP OP CAR RHAB W/ECG: CPT

## 2017-06-08 PROCEDURE — 40000116 ZZH STATISTIC OP CR VISIT

## 2017-06-12 ENCOUNTER — HOSPITAL ENCOUNTER (OUTPATIENT)
Dept: CARDIAC REHAB | Facility: CLINIC | Age: 67
End: 2017-06-12
Attending: INTERNAL MEDICINE
Payer: COMMERCIAL

## 2017-06-12 PROCEDURE — 93798 PHYS/QHP OP CAR RHAB W/ECG: CPT

## 2017-06-12 PROCEDURE — 93797 PHYS/QHP OP CAR RHAB WO ECG: CPT

## 2017-06-12 PROCEDURE — 40000575 ZZH STATISTIC OP CARDIAC VISIT #2

## 2017-06-12 PROCEDURE — 40000116 ZZH STATISTIC OP CR VISIT

## 2017-06-15 ENCOUNTER — HOSPITAL ENCOUNTER (OUTPATIENT)
Dept: CARDIAC REHAB | Facility: CLINIC | Age: 67
End: 2017-06-15
Attending: INTERNAL MEDICINE
Payer: COMMERCIAL

## 2017-06-15 PROCEDURE — 93798 PHYS/QHP OP CAR RHAB W/ECG: CPT | Performed by: OCCUPATIONAL THERAPIST

## 2017-06-15 PROCEDURE — 40000116 ZZH STATISTIC OP CR VISIT: Performed by: OCCUPATIONAL THERAPIST

## 2017-06-19 ENCOUNTER — HOSPITAL ENCOUNTER (OUTPATIENT)
Dept: CARDIAC REHAB | Facility: CLINIC | Age: 67
End: 2017-06-19
Attending: INTERNAL MEDICINE
Payer: COMMERCIAL

## 2017-06-19 PROCEDURE — 93798 PHYS/QHP OP CAR RHAB W/ECG: CPT | Performed by: REHABILITATION PRACTITIONER

## 2017-06-19 PROCEDURE — 40000116 ZZH STATISTIC OP CR VISIT: Performed by: REHABILITATION PRACTITIONER

## 2017-06-26 ENCOUNTER — HOSPITAL ENCOUNTER (OUTPATIENT)
Dept: CARDIAC REHAB | Facility: CLINIC | Age: 67
End: 2017-06-26
Attending: INTERNAL MEDICINE
Payer: COMMERCIAL

## 2017-06-26 VITALS — WEIGHT: 150 LBS | BODY MASS INDEX: 23.54 KG/M2 | HEIGHT: 67 IN

## 2017-06-26 PROCEDURE — 40000116 ZZH STATISTIC OP CR VISIT: Performed by: OCCUPATIONAL THERAPIST

## 2017-06-26 PROCEDURE — 93798 PHYS/QHP OP CAR RHAB W/ECG: CPT | Performed by: OCCUPATIONAL THERAPIST

## 2017-06-26 ASSESSMENT — 6 MINUTE WALK TEST (6MWT)
GENDER SELECTION: MALE
MALE CALC: 1765.09
FEMALE CALC: 1640.22
TOTAL DISTANCE WALKED (FT): 1370
PREDICTED: 1775.85

## 2017-06-29 ENCOUNTER — HOSPITAL ENCOUNTER (OUTPATIENT)
Dept: CARDIAC REHAB | Facility: CLINIC | Age: 67
End: 2017-06-29
Attending: INTERNAL MEDICINE
Payer: COMMERCIAL

## 2017-06-29 VITALS — BODY MASS INDEX: 23.7 KG/M2 | WEIGHT: 151 LBS | HEIGHT: 67 IN

## 2017-06-29 PROCEDURE — 93798 PHYS/QHP OP CAR RHAB W/ECG: CPT

## 2017-06-29 PROCEDURE — 40000116 ZZH STATISTIC OP CR VISIT

## 2017-06-29 ASSESSMENT — 6 MINUTE WALK TEST (6MWT)
FEMALE CALC: 1636.8
GENDER SELECTION: MALE
TOTAL DISTANCE WALKED (FT): 1370
PREDICTED: 1773.21
MALE CALC: 1762.46

## 2017-06-29 NOTE — PROGRESS NOTES
Physician cosignature/electronic signature indicates approval of this ITP document. I have established, reviewed and made necessary changes to the individualized treatment plan and exercise prescription for this patient.  Les Morse  1950  STEMI/Stent  Discharge Note       06/29/17 1100   Session   Session Discharge Note   Certified through this date 07/08/17   Cardiac Rehab Assessment   Cardiac Rehab Assessment 5/15/2017 Progress update done today. PT is steadily progressing with the exercise levels. He is walking daily at home and has returned to work part-time-15 hours per week. PT has a strong Ca[employer] eva and believes strongly that this help him through this last crisis. He doesn't feel he has many risk factors so he not sure why he had a MI. He will be having an echocardiogram later this week. Reviewed DASH diet with PT as he did not realize he had high blood pressure. He continues to benefit from education on recovery post MI as well as counseling for management of stress levels with skilled monitoring for sx as progress to higher levels. 5/26/2017. 1:1 consult completed. Reviewed risk factor profile report. PT is pleased with progress. PT is tolerating 3.9 METS without symptoms or complaints. PT is very motivated to making lifestyle changes. PT is very aware of low fat/salt choices. PT is reading labels and cardiac diet recommendations. After discussing risk factors, PT was able to identify that stress could be a primary contributor as he had gone through a lot of changes and health issues this past year. PT plans to go to the Healing Process class and also may be interested in meeting with the  at some point. PT expressed how fortunate he feels to be alive, but it has been hard to navigate through his feelings, especially when it can be hard for others to understand. PT does not feel he is depressed, but is interested in pursuing ways to discuss his feelings. Wife has been good support.  PT is walking on days not coming to rehab for at least 30 minutes. PT will be meeting with cardiologist at the end of June. PT is looking forward to meeting with MD to discuss future medication usage. PT has recently had beta blocker decreased due to symptoms of lightheadedness. PT reports that he feels 90% back to normal. PT continues to benefit from rehab for continued risk factor modification in regards to exercise and education post MI and stress management to decrease cardiac risk. 6/26/2017 Progress update done today. PT is progressing well with the exercise levels. He is feeling much better and feels he is back to normal. He is planning to discharge this week and will continue to exercise by walking with his wife or going to the club to exercise. He has returned part-time to work and it is going well since he has cut back on some responsibilities. Skilled therapy for discharge instructions for home program. Pt made significant gains in exercise tolerance. Initially patient tolerated 35 minutes at 3.0 METs, now tolerating 35 minutes at 4.7 METs. The PT was given instructions on frequency, intensity, and duration for continued exercise as well as muscle conditioning and stretching exercises.  Your PT plans to continue with a walking program 7 days per week, and will also go to the gym and continue with working out on the TM and with weights. All questions answered and PT discharged at this time.    General Information   Treatment Diagnosis STEMI   Date of Treatment Diagnosis 03/26/17   Secondary Treatment Diagnosis Stent   Significant Past CV History None   Comorbidities Metastatic Cancer   Other Medical History Metastatic Colon Cancer to the Liver, recent shingles   Lead up symptoms Chest pain   Hospital Location [facility] Discharge Date 04/05/17   Signs and Symptoms Post Hospital Discharge Fatigue   Outpatient Cardiac Rehab Start Date 04/26/17   Primary Physician Dr. Bren Sheehan   Primary  "Physician Follow Up Scheduled   Cardiologist Dr. Greene   Cardiologist Follow Up Scheduled   Ejection Fraction 40%   Risk Stratification Moderate   Summary of Cath Report   Summary of Cath Report Available   Date Performed 03/26/17   LAD mid: 100%   Living and Work Status    Living Arrangements and Social Status house   Support System Live with an adult   Return to Employment Yes   Occupation , writes books, has podcasts   Preventative Medications   CMS recommended medications Ace inhibitors;Beta Blocker;Antiplatelets;Lipid Lowering   Falls Screen   Have you fallen two or more times in the past year? No   Have you fallen and had an injury in the past year? No   Referral Initiated to Physical Therapy No   Pain   Patient Currently in Pain No   Physical Assessments   Incisions Not applicable   Edema None   Right Lung Sounds not assessed   Left Lung Sounds not assessed   Limitations No limitations   Individualized Treatment Plan   Monitored Sessions Scheduled 18   Monitored Sessions Attended 20   Oxygen   Supplemental Oxygen needed No   Nutrition Management - Weight Management   Assessment Re-assessment   Age 66   Weight 68.5 kg (151 lb)   Height 1.702 m (5' 7.01\")   BMI (Calculated) 23.69   Initial Rate Your Plate Score. Dietary tool to assess eating patterns. Scores range from 24 to 72. The higher the score the healthier the eating pattern. 57   Weight Management Comments 5/15/2017 Weight is appropriate. 5/26/2017. No change.    Nutrition Management - Lipids   Lipids Labs Not Available   Prescribed Lipid Medication Yes   Statin Intensity High Intensity   Lipid Comments 5/15/2017 No lab values available. 6/26/2017 No new labs.   Nutrition Management - Diabetes   Diabetes No   Nutrition Management Summary   Dietary Recommendations Low Sodium;Low Fat;Low Cholesterol   Stages of Change for Diet Compliance Maintenance   Interventions Planned Attend Nutrition Education Class(es)   Interventions In Progress or " Completed Other (see comments)   Nutrition Summary Comments 5/15/2017 PT feels he has a good understanding of his diet. He is not planning to attend the nutrition classes. 5/26/2017. No change. 6/26/2017 PT is very confident with his diet and weight is appropriate.   Nutrition Target Outcome BMI < 25   Psychosocial Management   Psychosocial Assessment Re-assessment   Is there history of clinical depression or increased risk of depression? No previous history   Current Level of Stress per Patient Report Moderate    Current Coping Skills Uses Stress Management/Relaxation Techniques;Has Positive Support System   Initial Patient Health Questionnaire -9 Score (PHQ-9) for depression. 5-9 Minimal symptoms, 10-14 Minor depression, 15-19 Major depression, moderately severe, > 20 Major depression, severe  2   Initial [facility] Survey score.  Quality of Life:   If total score > 25 review individual areas where patient rated a 4 or 5.  Consider patients current medical condition and what role that plays on the score.   Adjust treatment protocol to improve areas of concern.  Consider the following:  PHQ9 score, DASI, and re-assessment within the next 30 days to assist with developing treatments.  15   Stages of Change Preparation   Interventions Planned Patient to verbalize understanding of behavioral assessment results;Patient to verbalize understanding of negative impact of stress to personal health;Patient will recognize signs and symptoms of depression;Patient to attend stress management class(es)   Interventions In Progress or Completed Patient verbalizes understanding of negative impact of stress to personal health;Patient attended stress management class(es)   Patient Goal No   Psychosocial Comments 4/26/17 Pt. was previously , which was very stressful. Pt. has been cutting down on responsibilities since being in the hospital. 5/15/2017 PT has returned part-time to work. He denies any stress with  work currently. His blood pressure had been higher in the past with his health issues as they occurred. He is thinking of attending the class on stress management. Given updated class schedule. He has a strong eva and believes in a higher power with illness. 5/26/2017. PT plans on going to the Healing Process class. PT reports that he is not depressed, but would like to learn more about appropriate feelings during recovery. PT may be want to meet with  as well.  6/26/2017 PT is working 20-30 hours per week. 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 He is working part-time and has no issues with stress at work, He attended the Healing Process class.    Psychosocial Target Outcome Identify absence or presence of depression using valid screening tool   Other Core Components - Hypertension   History of or Diagnosis of Hypertension Yes   Currently taking Anti-Hypertensives Yes;Beta blocker;Ace Inhibitor   Hypertension Comments 5/15/2017 PT did not realize he had high blood pressure and was on medication to keep it under control. His BP has been well-controlled while in rehab. Explained to PT the purpose on the Metoprolol and lisinopril in controlling blood pressure. 5/26/2017. BP's well controlled. 6/26/2017 No change.   Other Core Components - Tobacco   History of Tobacco Use Yes   Quit Date or Planned Quit Date 01/01/80   Tobacco Use Status Former (Quit > 6 mo ago)   Tobacco Habit Cigarettes   Stages of Change Maintenance   Other Core Components Summary   Interventions Planned Instruct patient on the DASH diet   Interventions In Progress or Completed Instructed on DASH diet   Patient Goals No   Other Core Components Comments 5/15/2017 Given written information on the DASH diet and explained how it can help to control blood pressure.   Other Core Components Target Outcome BP < 140/90 or < 130/80 with DM or CKD   Activity/Exercise History   Activity/Exercise  Assessment Re-assessment   Activity/Exercise Status prior to event? Was Physically Active;Participated in an Exercise Program   Number of Days Currently participating in Moderate Physical Activity? 7   Number of Days Currently performing  Aerobic Exercise (including rehab)? 7   Number of Minutes per Session Currently of Aerobic Exercise (average)? 30-40   Current Stage of Change (Physical Activity) Action   Current Stage of Change (Aerobic Exercise) Action   Patient Goals Goal #1   Goal #1 Description Pt. will regain strength and endurance by attending [facility] 2 days per week and participating in aerobic exercise 2-3 days outside of rehab.   Goal #1 Target Date 06/26/17   Goal #1 Date Met 06/26/17   Goal #1 Progress Towards Goal 5/15/2017 PT is attending cardiac rehab and walking daily for 1.5-2 miles. He found walking at the faster speed of 2.8 mph to be very challenging last session, but would like to try again next time. He is currently at 2.7 mph with a 1% grade. Endurance is still limited at higher levels of exertion. Explained how his EF may be affecting his endurance at this point. 5/26/2017. No change. 6/26/2017 PT has reached 4.7 METs with exercise. He has increased his speed to 2.8 mph with a 4% grade. He feels his energy is back and is now napping only 2 days per week in the afternoon. He is walking up to 2-3 miles on the days he does not attend rehab. Goal Met.   Activity/Exercise Comments 4/26/17 Pt. states that he currently is walking about 1.5 miles everyday. 6/26/2017 See goal.   Activity/Exercise Target Outcome An Accumulation of 150  Minutes of Aerobic Activity per Week   Exercise Assessment   6 Minute Walk Predicted - Gender Selection Male   6 Minute Walk Predicted (Male) 1762.46   6 Minute Walk Predicted (Female) 1636.8   Initial 6 Minute Walk Distance (Feet) 1370 ft   Resting HR 59 bpm   Exercise  bpm   Post Exercise HR 60 bpm   Resting /60   Exercise /60   Post Exercise /60    Effort Rating 6   Current MET Level 4.7   MET Level Goal 4.5-5   ECG Rhythm Normal sinus rhythm   Ectopy None   Current Symptoms Denies symptoms   Limitations/Restrictions None   Exercise Prescription   Mode Treadmill;Weights   Duration/Time 30-45 min   Frequency 2 days/week   THR (85% of age predicted max HR) 132.6   OMNI Effort Rating (0-10 Scale) 4-6/10   Progression Continuous bouts;Aerobic exercise to OMNI rating of 6 or below and at or below THR;Total exercise time of 30-45 minutes;Progress peak intensity by 1/4 MET per week   Comments 5/15/2017 PT prefers to only exercise on the TM.   Recommended Home Exercise   Type of Exercise Walking   Frequency (days per week) 4 to 5   Duration (minutes per session) 30-45 min   Effort Rating Recommended 4-6/10   30 Day Exercise Plan Pt. was encouraged. 5/15/2017 To continue with the walking of 1.5-2 miles. He may eventually go to the fitness club to exercise.   Current Home Exercise   Type of Exercise Walking   Frequency (days per week) 5   Duration (minutes per session) 30-60   Follow-up/On-going Support   Provider follow-up needed on the following No follow-up needed   Learning Assessment   Learner Patient   Primary Language English   Preferred Learning Style Listening;Reading;Demonstration;Pictures/Video   Barriers to Learning No barriers noted   Patient Education   Education recommended Anatomy and Physiology of the Heart;Blood Pressure;Exercise Principles;Medication Overview;Nutrition;Stress Management   Education classes attended Stress Management   Education Comments 4/26/17 Pt. state him and his wife are very interested in coming to education classes.